# Patient Record
Sex: MALE | Race: WHITE | NOT HISPANIC OR LATINO | Employment: FULL TIME | URBAN - METROPOLITAN AREA
[De-identification: names, ages, dates, MRNs, and addresses within clinical notes are randomized per-mention and may not be internally consistent; named-entity substitution may affect disease eponyms.]

---

## 2017-01-11 RX ORDER — MELOXICAM 15 MG/1
TABLET ORAL
Qty: 30 TABLET | Refills: 0 | Status: SHIPPED | OUTPATIENT
Start: 2017-01-11

## 2017-01-11 RX ORDER — OXYCODONE AND ACETAMINOPHEN 7.5; 325 MG/1; MG/1
1 TABLET ORAL EVERY 8 HOURS PRN
Qty: 30 TABLET | Refills: 0 | Status: SHIPPED | OUTPATIENT
Start: 2017-01-11

## 2017-01-12 ENCOUNTER — OFFICE VISIT (OUTPATIENT)
Dept: SPINE | Facility: CLINIC | Age: 32
End: 2017-01-12
Attending: ANESTHESIOLOGY
Payer: MEDICAID

## 2017-01-12 VITALS
HEART RATE: 91 BPM | DIASTOLIC BLOOD PRESSURE: 71 MMHG | SYSTOLIC BLOOD PRESSURE: 158 MMHG | WEIGHT: 222 LBS | HEIGHT: 76 IN | BODY MASS INDEX: 27.03 KG/M2

## 2017-01-12 DIAGNOSIS — M79.10 MYALGIA: ICD-10-CM

## 2017-01-12 DIAGNOSIS — R53.81 PHYSICAL DECONDITIONING: ICD-10-CM

## 2017-01-12 DIAGNOSIS — M53.3 SACROILIAC JOINT PAIN: Primary | ICD-10-CM

## 2017-01-12 DIAGNOSIS — M54.16 LUMBAR RADICULOPATHY: ICD-10-CM

## 2017-01-12 PROCEDURE — 99999 PR PBB SHADOW E&M-EST. PATIENT-LVL III: CPT | Mod: PBBFAC,,, | Performed by: ANESTHESIOLOGY

## 2017-01-12 PROCEDURE — 99213 OFFICE O/P EST LOW 20 MIN: CPT | Mod: PBBFAC | Performed by: ANESTHESIOLOGY

## 2017-01-12 PROCEDURE — 99213 OFFICE O/P EST LOW 20 MIN: CPT | Mod: S$PBB,,, | Performed by: ANESTHESIOLOGY

## 2017-01-12 NOTE — PROGRESS NOTES
Chronic Pain - Follow Up    Referring Physician: Referral, Self    Chief Complaint:   Chief Complaint   Patient presents with    Back Pain        SUBJECTIVE: Disclaimer: This note has been generated using voice-recognition software. There may be typographical errors that have been missed during proof-reading  Interval history 1/12/2016:  Since previous encounter the patient continues to have bilateral lower back pain over the area of the sacroiliac joints and facet joints the lumbar spine.  He was evaluated by neurology who do not believe that an EMG or nerve conduction study is warranted at this time given his MRI findings of his lower back that showed prominent facet arthropathy but no evidence of neuroforaminal stenosis or narrowing or nerve root impingement.  He continues to take gabapentin 900 mg per day, meloxicam 15 mg per day and Percocet 7.5/325 when necessary he is also scheduled for starting physical therapy later this week.  No other health changes since previous encounter.    Initial encounter:    Jose Bartlett presents to the clinic for the evaluation of Low Back pain. The pain started 8 months ago following onset and symptoms have been persisting.  He was evaluated at an outside facility where he was experiencing groin numbness in addition to his radiculopathy and he had a brain MRI in the thoracic MRI which were within normal limits his lumbar MRI did reveal degenerative disc disease with broad-based disc bulging at L3-4 L4-5 but no significant neuroforaminal stenosis on 10/6/2016.  He has recently started gabapentin and is gradually escalated to 600 mg per day at this time.    Brief history:    Pain Description:    The pain is located in the Low back area and radiates to the Left lower extremity in the L5.      At BEST  3/10     At WORST  10/10 on the WORST day.      On average pain is rated as 8/10.     Today the pain is rated as 9/10    The pain is described as aching, sharp, throbbing  and tingling      Symptoms interfere with daily activity.     Exacerbating factors: any activities.      Mitigating factors laying down.     Patient denies night fever/night sweats, urinary incontinence, bowel incontinence, significant weight loss, significant motor weakness and loss of sensations.  Patient denies any suicidal or homicidal ideations    Pain Medications:  Current:  Gabapentin 600mg /day  meloxicam 15 mg /day  Oxycodone/actaminophen 7.5/325     Tried in Past:  TCA -Never  SNRI -Never  Muscle Relaxants -Never  Opioids-tramadol in the past    Physical Therapy/Home Exercise: no       report:  Reviewed and consistent with medication use as prescribed.    Pain Procedures: None    Chiropractor -never  Acupuncture - never  TENS unit -never  Spinal decompression -never  Joint replacement -never    Patient brought imaging reports with him from 10/6/2016 including brain MRI which was within normal limits, thoracic MRI which was within normal limits and lumbar MRI which shows degenerative disc disease with disc bulging at L3-4 L4-5 without significant neuroforaminal narrowing, full report scanned into epic     Right knee injury 12/2014     Past Surgical History   Procedure Laterality Date    Tonsillectomy       Social History     Social History    Marital status: Single     Spouse name: N/A    Number of children: N/A    Years of education: N/A     Occupational History    Not on file.     Social History Main Topics    Smoking status: Current Every Day Smoker     Packs/day: 0.50    Smokeless tobacco: Not on file    Alcohol use Yes    Drug use: Yes     Special: Marijuana    Sexual activity: Not on file     Other Topics Concern    Not on file     Social History Narrative     History reviewed. No pertinent family history.    No Known Allergies    Current Outpatient Prescriptions   Medication Sig    acetaminophen (TYLENOL) 500 MG tablet Take 2 tablets (1,000 mg total) by mouth every 8 (eight) hours as  "needed for Pain.    ascorbic acid (VITAMIN C) 1000 MG tablet Take 1,000 mg by mouth once daily.    b complex vitamins capsule Take 1 capsule by mouth once daily.    fish oil-omega-3 fatty acids 300-1,000 mg capsule Take 2 g by mouth once daily.    gabapentin (NEURONTIN) 300 MG capsule ONE C  QHS FOR 3 DAYS THEN INCREASE  1 C BID FOR 3 DAYS THEN INCREASE 1 C TID    glucosamine-chondroitin 500-400 mg tablet Take 1 tablet by mouth 3 (three) times daily.    ibuprofen (ADVIL,MOTRIN) 800 MG tablet Take 1 tablet (800 mg total) by mouth every 8 (eight) hours as needed for Pain.     No current facility-administered medications for this visit.        REVIEW OF SYSTEMS:    GENERAL:  No weight loss, malaise or fevers.  RESPIRATORY:  Negative for cough, wheezing or shortness of breath, patient denies any recent URI.  CARDIOVASCULAR:  Negative for chest pain, leg swelling or palpitations.  GI:  Negative for abdominal discomfort, blood in stools or black stools or change in bowel habits. Occasional stomach upset.  MUSCULOSKELETAL:  See HPI.  SKIN:  Negative for lesions, rash, and itching.  PSYCH:  No mood disorder or recent psychosocial stressors.  Patients sleep is disturbed secondary to pain.  HEMATOLOGY/LYMPHOLOGY:  Negative for prolonged bleeding, bruising easily or swollen nodes.  Patient is not currently taking any anti-coagulants  ENDO: No history of diabetes or thyroid dysfunction  NEURO:   No history of headaches, syncope, paralysis, seizures or tremors.  All other reviewed and negative other than HPI.    OBJECTIVE:    Visit Vitals    /78    Pulse 67    Temp 97.9 °F (36.6 °C) (Oral)    Ht 6' 4" (1.93 m)    Wt 101 kg (222 lb 10.6 oz)    BMI 27.1 kg/m2       PHYSICAL EXAMINATION:    GENERAL: Well appearing, in no acute distress, alert and oriented x3.  PSYCH:  Mood and affect appropriate.  SKIN: Skin color, texture, turgor normal, no rashes or lesions.  HEAD/FACE:  Normocephalic, atraumatic. Cranial nerves " grossly intact.  CV: RRR with palpation of the radial artery.  PULM: No evidence of respiratory difficulty, symmetric chest rise.  BACK: Straight leg raising in the sitting and supine positions is negative to radicular pain. There is pain with palpation over the facet joints of the lumbar spine bilaterally. There is decreased range of motion with extension to 15 degrees, and facet loading maneuvers cause reproducible pain left > right.    EXTREMITIES: Peripheral joint ROM is full and pain free without obvious instability or laxity in all four extremities. No deformities, edema, or skin discoloration. Good capillary refill.  MUSCULOSKELETAL: Hip, and knee provocative maneuvers are negative.  There is  pain with palpation over the sacroiliac joints bilaterally left > right.  There is no pain to palpation over the greater trochanteric bursa bilaterally.  FABERs test is positive on the left.  FADIRs test is positive on the left.   Bilateral lower extremity strength is normal and symmetric.  No atrophy or tone abnormalities are noted.  NEURO: Bilateral lower extremity coordination and muscle stretch reflexes are physiologic and symmetric.  Plantar response are downgoing. No clonus.  No loss of sensation is noted.  GAIT: Antalgic, ambulates without assistance       ASSESSMENT: 31 y.o. year old male with pain, consistent with     Encounter Diagnoses   Name Primary?    Sacroiliac joint pain     Left lumbar radiculopathy     Myalgia     Physical deconditioning        PLAN:     meloxicam 15 mg daily quantity #30, refill 1    Oxycodone/acetaminophen 7.5/325 one tablet every 6 hours by mouth when necessary quantity #30 for breakthrough pain     We will wean gabapentin every 3 days until discontinued    We had an extensive conversation about the importance of continued stretching exercises for the lumbar spine the importance of core strengthening and stretches for the piriformis muscle and sacroiliac joint.    Begin PT as  previously ordered    F/u in 3 [facility]    Herrera Gordillo   10/21/2016